# Patient Record
Sex: FEMALE | Race: WHITE | NOT HISPANIC OR LATINO | Employment: FULL TIME | URBAN - METROPOLITAN AREA
[De-identification: names, ages, dates, MRNs, and addresses within clinical notes are randomized per-mention and may not be internally consistent; named-entity substitution may affect disease eponyms.]

---

## 2017-01-27 ENCOUNTER — TELEPHONE (OUTPATIENT)
Dept: VASCULAR SURGERY | Facility: CLINIC | Age: 47
End: 2017-01-27

## 2017-01-27 NOTE — TELEPHONE ENCOUNTER
Spoke to pt regarding rescheduling procedure.  She did not have transportation for 2/1/17 appointment.  She stated she will call back when she is by a calendar to reschedule.

## 2017-01-27 NOTE — TELEPHONE ENCOUNTER
----- Message from [place] sent at 1/26/2017  3:51 PM CST -----  Pt called to reschedule the procedure/pls call back at [phone]